# Patient Record
Sex: FEMALE
[De-identification: names, ages, dates, MRNs, and addresses within clinical notes are randomized per-mention and may not be internally consistent; named-entity substitution may affect disease eponyms.]

---

## 2018-10-06 ENCOUNTER — HOSPITAL ENCOUNTER (INPATIENT)
Dept: HOSPITAL 31 - C.ER | Age: 68
LOS: 4 days | Discharge: HOME | DRG: 391 | End: 2018-10-10
Attending: INTERNAL MEDICINE | Admitting: INTERNAL MEDICINE
Payer: MEDICARE

## 2018-10-06 DIAGNOSIS — Z90.49: ICD-10-CM

## 2018-10-06 DIAGNOSIS — K20.9: ICD-10-CM

## 2018-10-06 DIAGNOSIS — I10: ICD-10-CM

## 2018-10-06 DIAGNOSIS — I81: ICD-10-CM

## 2018-10-06 DIAGNOSIS — K63.5: ICD-10-CM

## 2018-10-06 DIAGNOSIS — J44.9: ICD-10-CM

## 2018-10-06 DIAGNOSIS — K29.00: Primary | ICD-10-CM

## 2018-10-06 DIAGNOSIS — J30.2: ICD-10-CM

## 2018-10-06 DIAGNOSIS — Z90.710: ICD-10-CM

## 2018-10-06 DIAGNOSIS — I35.0: ICD-10-CM

## 2018-10-06 DIAGNOSIS — E03.9: ICD-10-CM

## 2018-10-06 DIAGNOSIS — E87.6: ICD-10-CM

## 2018-10-06 DIAGNOSIS — G47.33: ICD-10-CM

## 2018-10-06 DIAGNOSIS — Z80.0: ICD-10-CM

## 2018-10-06 DIAGNOSIS — K59.09: ICD-10-CM

## 2018-10-06 DIAGNOSIS — Z82.49: ICD-10-CM

## 2018-10-06 DIAGNOSIS — Z86.718: ICD-10-CM

## 2018-10-06 DIAGNOSIS — T38.0X5A: ICD-10-CM

## 2018-10-06 DIAGNOSIS — R13.10: ICD-10-CM

## 2018-10-06 DIAGNOSIS — Z90.81: ICD-10-CM

## 2018-10-06 DIAGNOSIS — E78.5: ICD-10-CM

## 2018-10-06 DIAGNOSIS — Z79.01: ICD-10-CM

## 2018-10-06 LAB
ALBUMIN SERPL-MCNC: 4.3 G/DL (ref 3.5–5)
ALBUMIN/GLOB SERPL: 1.4 {RATIO} (ref 1–2.1)
ALT SERPL-CCNC: 31 U/L (ref 9–52)
AST SERPL-CCNC: 22 U/L (ref 14–36)
BASOPHILS # BLD AUTO: 0.1 K/UL (ref 0–0.2)
BASOPHILS NFR BLD: 0.3 % (ref 0–2)
BUN SERPL-MCNC: 26 MG/DL (ref 7–17)
CALCIUM SERPL-MCNC: 9.7 MG/DL (ref 8.6–10.4)
EOSINOPHIL # BLD AUTO: 0 K/UL (ref 0–0.7)
EOSINOPHIL NFR BLD: 0.1 % (ref 0–4)
ERYTHROCYTE [DISTWIDTH] IN BLOOD BY AUTOMATED COUNT: 14 % (ref 11.5–14.5)
GFR NON-AFRICAN AMERICAN: > 60
HGB BLD-MCNC: 14.5 G/DL (ref 11–16)
INR PPP: 1.1
LYMPHOCYTES # BLD AUTO: 4.6 K/UL (ref 1–4.3)
LYMPHOCYTES NFR BLD AUTO: 25 % (ref 20–40)
MCH RBC QN AUTO: 29.7 PG (ref 27–31)
MCHC RBC AUTO-ENTMCNC: 33.7 G/DL (ref 33–37)
MCV RBC AUTO: 88 FL (ref 81–99)
MONOCYTES # BLD: 0.9 K/UL (ref 0–0.8)
MONOCYTES NFR BLD: 5.1 % (ref 0–10)
NEUTROPHILS # BLD: 12.8 K/UL (ref 1.8–7)
NEUTROPHILS NFR BLD AUTO: 69.5 % (ref 50–75)
NRBC BLD AUTO-RTO: 0 % (ref 0–2)
PLATELET # BLD: 364 K/UL (ref 130–400)
PMV BLD AUTO: 8.7 FL (ref 7.2–11.7)
PROTHROMBIN TIME: 11.5 SECONDS (ref 9.7–12.2)
RBC # BLD AUTO: 4.9 MIL/UL (ref 3.8–5.2)
WBC # BLD AUTO: 18.4 K/UL (ref 4.8–10.8)

## 2018-10-07 LAB
ALBUMIN SERPL-MCNC: 3.5 G/DL (ref 3.5–5)
ALBUMIN/GLOB SERPL: 1.3 {RATIO} (ref 1–2.1)
ALT SERPL-CCNC: 41 U/L (ref 9–52)
AST SERPL-CCNC: 39 U/L (ref 14–36)
BASOPHILS # BLD AUTO: 0 K/UL (ref 0–0.2)
BASOPHILS NFR BLD: 0.3 % (ref 0–2)
BILIRUB UR-MCNC: NEGATIVE MG/DL
BUN SERPL-MCNC: 31 MG/DL (ref 7–17)
CALCIUM SERPL-MCNC: 9 MG/DL (ref 8.6–10.4)
EOSINOPHIL # BLD AUTO: 0.1 K/UL (ref 0–0.7)
EOSINOPHIL NFR BLD: 1 % (ref 0–4)
ERYTHROCYTE [DISTWIDTH] IN BLOOD BY AUTOMATED COUNT: 14.4 % (ref 11.5–14.5)
GFR NON-AFRICAN AMERICAN: > 60
GLUCOSE UR STRIP-MCNC: NORMAL MG/DL
HDLC SERPL-MCNC: 69 MG/DL (ref 30–70)
HGB BLD-MCNC: 13.2 G/DL (ref 11–16)
LDLC SERPL-MCNC: 77 MG/DL (ref 0–129)
LEUKOCYTE ESTERASE UR-ACNC: (no result) LEU/UL
LYMPHOCYTES # BLD AUTO: 3 K/UL (ref 1–4.3)
LYMPHOCYTES NFR BLD AUTO: 21.1 % (ref 20–40)
MCH RBC QN AUTO: 29.9 PG (ref 27–31)
MCHC RBC AUTO-ENTMCNC: 33.6 G/DL (ref 33–37)
MCV RBC AUTO: 88.9 FL (ref 81–99)
MONOCYTES # BLD: 1.2 K/UL (ref 0–0.8)
MONOCYTES NFR BLD: 8.6 % (ref 0–10)
NEUTROPHILS # BLD: 9.7 K/UL (ref 1.8–7)
NEUTROPHILS NFR BLD AUTO: 69 % (ref 50–75)
NRBC BLD AUTO-RTO: 0 % (ref 0–2)
PH UR STRIP: 6 [PH] (ref 5–8)
PLATELET # BLD: 304 K/UL (ref 130–400)
PMV BLD AUTO: 9 FL (ref 7.2–11.7)
PROT UR STRIP-MCNC: NEGATIVE MG/DL
RBC # BLD AUTO: 4.42 MIL/UL (ref 3.8–5.2)
RBC # UR STRIP: NEGATIVE /UL
SP GR UR STRIP: 1.04 (ref 1–1.03)
SQUAMOUS EPITHIAL: < 1 /HPF (ref 0–5)
UROBILINOGEN UR-MCNC: NORMAL MG/DL (ref 0.2–1)
WBC # BLD AUTO: 14.1 K/UL (ref 4.8–10.8)

## 2018-10-07 RX ADMIN — ROSUVASTATIN CALCIUM SCH MG: 5 TABLET, FILM COATED ORAL at 21:28

## 2018-10-07 RX ADMIN — DEXTROSE AND SODIUM CHLORIDE SCH MLS/HR: 5; 450 INJECTION, SOLUTION INTRAVENOUS at 17:36

## 2018-10-07 RX ADMIN — POLYETHYLENE GLYCOL 3350 SCH GM: 17 POWDER, FOR SOLUTION ORAL at 17:32

## 2018-10-07 RX ADMIN — DEXTROSE AND SODIUM CHLORIDE SCH MLS/HR: 5; 450 INJECTION, SOLUTION INTRAVENOUS at 02:01

## 2018-10-07 NOTE — C.PDOC
History Of Present Illness


67 year old female, with PSHx of splenectomy and cholecystectomy, presents to ED

with complaints of LUQ epigastric pain, associated with nausea and vomiting. 

Patient takes eliquis for portal vein thrombosis annually. Denies any diarrhea 

or constipation. 


Time Seen by Provider: 10/06/18 23:08


Chief Complaint (Nursing): Abdominal Pain


History Per: Patient


History/Exam Limitations: no limitations


Onset/Duration Of Symptoms: Days


Current Symptoms Are (Timing): Still Present





Past Medical History


Reviewed: Historical Data, Nursing Documentation, Vital Signs


Vital Signs: 





                                Last Vital Signs











Temp  98 F   10/06/18 22:29


 


Pulse  67   10/06/18 22:29


 


Resp  22   10/06/18 22:29


 


BP  146/101 H  10/06/18 22:29


 


Pulse Ox  99   10/06/18 22:29














- Medical History


PMH: HTN


Family History: States: No Known Family Hx





- Social History


Hx Alcohol Use: Yes


Hx Substance Use: No





Review Of Systems


Except As Marked, All Systems Reviewed And Found Negative.


Cardiovascular: Negative for: Chest Pain


Respiratory: Negative for: Shortness of Breath


Gastrointestinal: Positive for: Nausea, Vomiting, Abdominal Pain (LUQ 

epigastric).  Negative for: Diarrhea


Neurological: Negative for: Weakness, Numbness





Physical Exam





- Physical Exam


Appears: Non-toxic, Other (Moderate distress)


Skin: Warm, Dry


Head: Atraumatic, Normacephalic


Eye(s): bilateral: Normal Inspection


Oral Mucosa: Moist


Chest: Symmetrical


Cardiovascular: Rhythm Regular, No Murmur


Respiratory: Normal Breath Sounds, No Rales, No Rhonchi, No Wheezing


Gastrointestinal/Abdominal: Bowel Sounds (Diminished), Tenderness (in 

epigastrium)


Neurological/Psych: Oriented x3, Normal Speech





ED Course And Treatment





- Laboratory Results


Result Diagrams: 


                                 10/06/18 22:57





                                 10/06/18 22:57


Lab Interpretation: Abnormal (LFT, trop neg.)


ECG: Interpreted By Me


ECG Rhythm: Sinus Rhythm


ECG Interpretation: Normal


Rate From EC


O2 Sat by Pulse Oximetry: 99 (RA)


Pulse Ox Interpretation: Normal





- Radiology


CXR: Interpreted by Me


CXR Interpretation: Yes: No Acute Disease





- Other Rad


  ** abd x 2


X-Ray: Interpreted by Me (+FOS)





- CT Scan/US


  ** CT Abd/Pel


Other Rad Studies (CT/US): Read By Radiologist, Radiology Report Reviewed


CT/US Interpretation: IMPRESSION:  Changes from prior thrombosis of the left 

branch of the portal vein. It appears that small in caliber but recanalize with 

normal flow the current exam.  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 

the level of the yfn hepatis.  Uncomplicated colonic diverticulosis.  

Absent/atrophic spleen.  Prior hysterectomy.  No evidence of acute abdominal or 

pelvic pathology.


Reevaluation Time: 01:09


Reassessment Condition: Improved (belly benign)





- Physician Consult Information


Outcome Of Conversation: 0100: d/w Dr. Trujillo- Medicine On Call- ok to 

admit.





Medical Decision Making


Medical Decision Making: 


Impression: Abdominal pain





Plan:


--CT Abd/Pel


--EKG


--Labs


--Obstructive X-Ray


--IV Fluids


--Pepcid


--Zofran


--Toradol


--Percocet


--Urinalysis








h/o splenectomy and portla vein thrombosis


on eliquis BID PO


normal flow today





Acute colicky pain


+FOS


recommend overnight laxative





Leukocytosis 


no acute infection


prob related to vomiting


recheck for trend in AM








Disposition


Doctor Will See Patient In The: Hospital


Counseled Patient/Family Regarding: Studies Performed, Diagnosis





- Disposition


Disposition: HOSPITALIZED


Disposition Time: 01:11


Condition: GOOD





- Clinical Impression


Clinical Impression: 


 Acute abdominal pain








- Scribe Statement


The provider has reviewed the documentation as recorded by the Neil Shankar





Provider Attestation: 


All medical record entries made by the Neil were at my direction and 

personally dictated by me. I have reviewed the chart and agree that the record 

accurately reflects my personal performance of the history, physical exam, 

medical decision making, and the department course for this patient. I have also

personally directed, reviewed, and agree with the discharge instructions and 

disposition.

## 2018-10-07 NOTE — CT
Date of service: 



10/07/2018



PROCEDURE:  CT Abdomen and Pelvis with Oral contrast.



HISTORY:

Abdominal pain, with history of portal vein thrombosis.



COMPARISON:

No prior study available for comparison



TECHNIQUE:

Contiguous axial images of the abdomen and pelvis performed following 

intravenous injection of approximately 100 cc Visipaque 320 contrast 

material.  The coronal and Sagittal reformats generated. 



Radiation dose:



Total exam DLP = 498.23 mGy-cm.



This CT exam was performed using one or more of the following dose 

reduction techniques: Automated exposure control, adjustment of the 

mA and/or kV according to patient size, and/or use of iterative 

reconstruction technique.



FINDINGS:



LOWER THORAX:

There is mild passive / dependent type atelectasis both posterior 

lower lung zones. There are also areas of linear atelectasis/scarring 

in the both lung bases left greater than right including the lingular 

region. No effusion or basilar pneumothorax. Heart size within range 

of normal. No significant pericardial effusion. Small hiatal hernia. 



Including the lingular 



LIVER:

The liver exhibits normal size measuring nearly 16 cm in CC 

dimension.  Moderate fatty hepatic infiltration.  No obvious hepatic 

mass collection or calcification.. 



Suspect cavernous transformation related to prior on thrombosis of 

the portal vein, presumably the left portal vein which is currently 

patent (likely recannulated) though small in caliber than the right 



GALLBLADDER AND BILE DUCTS:

Gallbladder is not visible and presumably has been resected however 

clinical correlation recommended. 



PANCREAS:

Pancreas appears slightly atrophic and fatty replaced.  No obvious 

pancreatic masses collections or calcifications. 



SPLEEN:

Spleen is not visible and may be atrophic or may have been surgically 

resected however clinical correlation recommended. 



ADRENALS:

No adrenal lesions.. 



KIDNEYS AND URETERS:

Kidneys demonstrate symmetric nephrograms. No evidence of 

nephrolithiasis or hydronephrosis. 



BLADDER:

Grossly unremarkable.



REPRODUCTIVE:

Hysterectomy 



APPENDIX:

Appendix is not seen with complete certainty however no obvious 

inflammatory changes right lower quadrant of the abdomen. 



BOWEL:

Evaluation of the bowel is limited due to the lack of oral contrast 

material.  Stomach is incompletely distended.  Visualized loops of 

small bowel exhibit normal contour and caliber.  No evidence of acute 

mechanical small bowel obstruction.  Moderate amount of stool seen 

within the cecum ascending and proximal transverse colon suggesting 

mild fecal retention/constipation. 



PERITONEUM:

Unremarkable. No fluid collection. No free air.  Small fat containing 

umbilical hernia.  Small fat containing right inguinal hernia. 



LYMPH NODES:

Multiple small nonspecific mesenteric lymph nodes are present. There 

are also a few tiny nonspecific retroperitoneal lymph nodes. 



VASCULATURE:

Unremarkable. No aortic aneurysm. 



BONES:

Mild multilevel degenerative spondylosis of the lower thoracic and to 

a lesser degree lumbar spine. 



OTHER FINDINGS:

None. 



IMPRESSION:

Fatty hepatic infiltration. Suspect cavernous transformation related 

to prior on thrombosis of the portal vein, presumably the left portal 

vein which is currently patent (likely recannulated) though small in 

caliber than the right. 



Gallbladder not visualized and presumably has been resected however 

clinical correlation surgical history.  The spleen is not present and 

there are no obvious surgical clips or suture material seen in the 

left upper quadrant of the abdomen.  Rule out splenic atrophy versus 

post surgical sequela.



Hysterectomy.

## 2018-10-07 NOTE — CP.PCM.PN
Subjective





- Date & Time of Evaluation


Date of Evaluation: 10/07/18


Time of Evaluation: 11:48





- Subjective


Subjective: 





H&P dictated #[id number]





Objective





- Vital Signs/Intake and Output


Vital Signs (last 24 hours): 


                                        











Temp Pulse Resp BP Pulse Ox


 


 98.6 F   53 L  20   116/66   99 


 


 10/07/18 08:14  10/07/18 08:14  10/07/18 08:14  10/07/18 08:14  10/07/18 08:14











- Medications


Medications: 


                               Current Medications





Al Hydrox/Mg Hydrox/Simethicone (Maalox 30 Ml)  30 ml PO ONCE ONE


   Stop: 10/07/18 12:01


   Last Admin: 10/07/18 11:41 Dose:  30 ml


Albuterol (Ventolin Hfa 90 Mcg/Actuation (8 G))  1 puff IH PRN PRN


   PRN Reason: Shortness of Breath


Apixaban (Eliquis)  5 mg PO BID [facility]


   Last Admin: 10/07/18 10:07 Dose:  5 mg


Home Med (Linaclotide [Linzess])  290 mcg PO DAILY [facility]


   Last Admin: 10/07/18 10:08 Dose:  Not Given


Home Med (Omeprazole [Omeprazole])  40 mg PO DAILY [facility]


   Last Admin: 10/07/18 10:08 Dose:  Not Given


Hydrochlorothiazide (Microzide)  25 mg PO DAILY [facility]


   Last Admin: 10/07/18 10:07 Dose:  25 mg


Dextrose/Sodium Chloride (Dextrose 5%/0.45% Ns 1000 Ml)  1,000 mls @ 75 mls/hr 

IV .[id number] [facility]


   Last Admin: 10/07/18 02:01 Dose:  75 mls/hr


Levothyroxine Sodium (Levothroid)  137 mcg PO DAILY [facility]


   Last Admin: 10/07/18 10:07 Dose:  137 mcg


Ondansetron HCl (Zofran Inj)  4 mg IVP Q8 PRN


   PRN Reason: Nausea/Vomiting


Pantoprazole Sodium (Protonix Inj)  40 mg IVP ONCE ONE


   Stop: 10/07/18 12:01


   Last Admin: 10/07/18 11:41 Dose:  40 mg











- Labs


Labs: 


                                        





                                 10/07/18 08:43 





                                 10/07/18 08:43 





                                        











PT  11.5 SECONDS (9.7-12.2)   10/06/18  23:19    


 


INR  1.1   10/06/18  23:19

## 2018-10-07 NOTE — CP.PCM.CON
<KaylieJose R - Last Filed: 10/07/18 15:51>





History of Present Illness





- History of Present Illness


History of Present Illness: 





GI Fellow PGY4, consult note.





Montserrat Wetzel is a very pleasant 67F [place] speaking only with hx 

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 

procedures presenting with acute epigastric pain, N/V. She felt well yesterday 

until she was at a friends house at a social gathering. 30 minutes into 

arriving, she developed progressive burning epigastric pain that was 10/10 at 

max. She denies eating or drinking directly before this painful episode. There 

was report of emesis but not bloody emesis or blood in stool reported. She was 

unable to tolerate the pain and ambulance was called. She was given medications 

through the IV which helped. Now, she is in 4/10 pain. She admits she has had 

progressive dysphagia as well, solids more than liquids. Recently she felt like 

she was choking on food.





Patient has had abdominal pains in the past. In fact, she had EGD and 

colonoscopy one year ago at different facility. There were no reported problems 

except for polyps in the colon per patient's memory. She see Dr. Hernandez as an 

outpatient GI. She takes Omeprazole intermittently. She had been taking 

prednisone x3 days for a cough prior to presentation. She denies taking much 

Ibuprofen or other NSAIDs. No history of GI bleed in the past. She admits her 

father  from gastric cancer. She denies smoking or alcohol use. 





She had PVT 8 years ago and it resolved on Coumadin. Unfortunately, it returned 

and now she is on Eliquis indefinitely.





CT abd/pelv reviewed. No signs of obstruction. Moderate amount of stool in right

colon. 





Hb normal and stable.





PMHx - see above


PSHx - cholecystectomy, splenectomy, hysterectomy, EGD/CSPY in 2017


FMHx - Father gastric cancer


SocHx - denies drinking EtOH, denies smoking. . 





12pt ROS negative except for above.

















Past Patient History





- Past Social History


Smoking Status: Never Smoked





- CARDIAC


Hx Hypertension: Yes





- ENDOCRINE/METABOLIC


Other/Comment: thyroid disease





- PSYCHIATRIC


Hx Substance Use: No





Meds


Allergies/Adverse Reactions: 


                                    Allergies











Allergy/AdvReac Type Severity Reaction Status Date / Time


 


morphine Allergy   Verified 10/06/18 22:34














- Medications


Medications: 


                               Current Medications





Al Hydrox/Mg Hydrox/Simethicone (Maalox Plus 30 Ml)  30 ml PO Q8H PRN


   PRN Reason: Indigestion / Heartburn


Albuterol (Ventolin Hfa 90 Mcg/Actuation (8 G))  1 puff IH RQ24 PRN


   PRN Reason: Shortness of Breath


Apixaban (Eliquis)  5 mg PO BID [facility]


   Last Admin: 10/07/18 10:07 Dose:  5 mg


Home Med (Linaclotide [Linzess])  290 mcg PO DAILY [facility]


Hydrochlorothiazide (Hydrodiuril)  25 mg PO DAILY [facility]


Dextrose/Sodium Chloride (Dextrose 5%/0.45% Ns 1000 Ml)  1,000 mls @ 75 mls/hr 

IV .[id number] [facility]


   Last Admin: 10/07/18 02:01 Dose:  75 mls/hr


Levothyroxine Sodium (Levothroid)  137 mcg PO DAILY@0630 [facility]


Ondansetron HCl (Zofran Inj)  4 mg IVP Q8 PRN


   PRN Reason: Nausea/Vomiting


Pantoprazole Sodium (Protonix Inj)  40 mg IVP DAILY [facility]


Rosuvastatin Calcium (Crestor)  2.5 mg PO HS [facility]











Physical Exam





- Constitutional


Appears: Well, Non-toxic, No Acute Distress





- Head Exam


Head Exam: NORMAL INSPECTION, NORMOCEPHALIC





- Eye Exam


Eye Exam: EOMI, Normal appearance





- ENT Exam


ENT Exam: Mucous Membranes Moist, Normal Exam





- Respiratory Exam


Respiratory Exam: Clear to Auscultation Bilateral, NORMAL BREATHING PATTERN





- Cardiovascular Exam


Cardiovascular Exam: Bradycardia, REGULAR RHYTHM, +S1, +S2





- GI/Abdominal Exam


GI & Abdominal Exam: Normal Bowel Sounds, Soft, Tenderness


Additional comments: 





Moderate tenderness in epigastric area





- Extremities Exam


Extremities exam: Positive for: normal inspection


Additional comments: 





Left arm swelling (IV infiltration)





- Neurological Exam


Neurological exam: Alert, CN II-XII Intact, Oriented x3





- Psychiatric Exam


Psychiatric exam: Normal Affect, Normal Mood





- Skin


Skin Exam: Dry, Intact





Results





- Vital Signs


Recent Vital Signs: 


                                Last Vital Signs











Temp  98.6 F   10/07/18 08:14


 


Pulse  53 L  10/07/18 08:14


 


Resp  20   10/07/18 08:14


 


BP  116/66   10/07/18 08:14


 


Pulse Ox  99   10/07/18 08:14














- Labs


Result Diagrams: 


                                 10/07/18 08:43





                                 10/07/18 08:43


Labs: 


                         Laboratory Results - last 24 hr











  10/06/18 10/06/18 10/06/18





  22:57 22:57 23:13


 


WBC  18.4 H  


 


RBC  4.90  


 


Hgb  14.5  


 


Hct  43.1  


 


MCV  88.0  


 


MCH  29.7  


 


MCHC  33.7  


 


RDW  14.0  


 


Plt Count  364  


 


MPV  8.7  


 


Neut % (Auto)  69.5  


 


Lymph % (Auto)  25.0  


 


Mono % (Auto)  5.1  


 


Eos % (Auto)  0.1  


 


Baso % (Auto)  0.3  


 


Neut # (Auto)  12.8 H  


 


Lymph # (Auto)  4.6 H  


 


Mono # (Auto)  0.9 H  


 


Eos # (Auto)  0.0  


 


Baso # (Auto)  0.1  


 


PT   


 


INR   


 


Sodium   141 


 


Potassium   3.4 L 


 


Chloride   105 


 


Carbon Dioxide   23 


 


Anion Gap   16 


 


BUN   26 H 


 


Creatinine   0.7 


 


Est GFR ( Amer)   > 60 


 


Est GFR (Non-Af Amer)   > 60 


 


Random Glucose   155 H 


 


Calcium   9.7 


 


Total Bilirubin   0.3 


 


AST   22 


 


ALT   31 


 


Alkaline Phosphatase   105 


 


Troponin I   


 


Total Protein   7.3 


 


Albumin   4.3 


 


Globulin   3.0 


 


Albumin/Globulin Ratio   1.4 


 


Triglycerides   


 


Cholesterol   


 


LDL Cholesterol Direct   


 


HDL Cholesterol   


 


Lipase   


 


TSH 3rd Generation   


 


Urine Color    Yellow


 


Urine Clarity    Clear


 


Urine pH    6.0


 


Ur Specific Gravity    1.045 H


 


Urine Protein    Negative


 


Urine Glucose (UA)    Normal


 


Urine Ketones    Negative


 


Urine Blood    Negative


 


Urine Nitrate    Negative


 


Urine Bilirubin    Negative


 


Urine Urobilinogen    Normal


 


Ur Leukocyte Esterase    1+ H


 


Urine WBC (Auto)    2


 


Urine RBC (Auto)    2


 


Ur Squamous Epith Cells    < 1














  10/06/18 10/06/18 10/07/18





  23:19 23:20 00:19


 


WBC   


 


RBC   


 


Hgb   


 


Hct   


 


MCV   


 


MCH   


 


MCHC   


 


RDW   


 


Plt Count   


 


MPV   


 


Neut % (Auto)   


 


Lymph % (Auto)   


 


Mono % (Auto)   


 


Eos % (Auto)   


 


Baso % (Auto)   


 


Neut # (Auto)   


 


Lymph # (Auto)   


 


Mono # (Auto)   


 


Eos # (Auto)   


 


Baso # (Auto)   


 


PT  11.5  


 


INR  1.1  


 


Sodium   


 


Potassium   


 


Chloride   


 


Carbon Dioxide   


 


Anion Gap   


 


BUN   


 


Creatinine   


 


Est GFR ( Amer)   


 


Est GFR (Non-Af Amer)   


 


Random Glucose   


 


Calcium   


 


Total Bilirubin   


 


AST   


 


ALT   


 


Alkaline Phosphatase   


 


Troponin I    < 0.0120


 


Total Protein   


 


Albumin   


 


Globulin   


 


Albumin/Globulin Ratio   


 


Triglycerides   


 


Cholesterol   


 


LDL Cholesterol Direct   


 


HDL Cholesterol   


 


Lipase   132 


 


TSH 3rd Generation   


 


Urine Color   


 


Urine Clarity   


 


Urine pH   


 


Ur Specific Gravity   


 


Urine Protein   


 


Urine Glucose (UA)   


 


Urine Ketones   


 


Urine Blood   


 


Urine Nitrate   


 


Urine Bilirubin   


 


Urine Urobilinogen   


 


Ur Leukocyte Esterase   


 


Urine WBC (Auto)   


 


Urine RBC (Auto)   


 


Ur Squamous Epith Cells   














  10/07/18 10/07/18 10/07/18





  08:43 08:43 13:41


 


WBC  14.1 H  


 


RBC  4.42  


 


Hgb  13.2  


 


Hct  39.3  


 


MCV  88.9  


 


MCH  29.9  


 


MCHC  33.6  


 


RDW  14.4  


 


Plt Count  304  


 


MPV  9.0  


 


Neut % (Auto)  69.0  


 


Lymph % (Auto)  21.1  


 


Mono % (Auto)  8.6  


 


Eos % (Auto)  1.0  


 


Baso % (Auto)  0.3  


 


Neut # (Auto)  9.7 H  


 


Lymph # (Auto)  3.0  


 


Mono # (Auto)  1.2 H  


 


Eos # (Auto)  0.1  


 


Baso # (Auto)  0.0  


 


PT   


 


INR   


 


Sodium   141 


 


Potassium   3.5 L 


 


Chloride   103 


 


Carbon Dioxide   28 


 


Anion Gap   13 


 


BUN   31 H 


 


Creatinine   0.8 


 


Est GFR ( Amer)   > 60 


 


Est GFR (Non-Af Amer)   > 60 


 


Random Glucose   105 


 


Calcium   9.0 


 


Total Bilirubin   0.7 


 


AST   39 H D 


 


ALT   41 


 


Alkaline Phosphatase   72 


 


Troponin I    < 0.0120


 


Total Protein   6.2 L 


 


Albumin   3.5 


 


Globulin   2.7 


 


Albumin/Globulin Ratio   1.3 


 


Triglycerides   63 


 


Cholesterol   169 


 


LDL Cholesterol Direct   77 


 


HDL Cholesterol   69 


 


Lipase   


 


TSH 3rd Generation   0.55 


 


Urine Color   


 


Urine Clarity   


 


Urine pH   


 


Ur Specific Gravity   


 


Urine Protein   


 


Urine Glucose (UA)   


 


Urine Ketones   


 


Urine Blood   


 


Urine Nitrate   


 


Urine Bilirubin   


 


Urine Urobilinogen   


 


Ur Leukocyte Esterase   


 


Urine WBC (Auto)   


 


Urine RBC (Auto)   


 


Ur Squamous Epith Cells   














Assessment & Plan





- Assessment and Plan (Free Text)


Assessment: 





67F presenting with epigastric pain





#Acute abdominal pain - possible PUD vs constipation


#Dyspepsia


#Dysphagia


#Fm hx of Gastric CA


#Chronic constipation on Linzess


#Hx of PVT on Eliquis


#Hypothyroidism


#s/p dilip, splenectomy, hysterectomy





PLAN:


-CT reviewed: No obstruction. Portal veins are patent. Moderate stool in colon. 


-Continue PPI. Stop toradol. 


-Bowel regimen


-Recommend EGD for acute epigastric pain, dysphagia and family history. 


-She is on Eliquis, last dose today. We could possible do diagnostic study 

tomorrow or wait until Wednesday/Thursday. This could be done as outpatient if 

patient feels much improved. I will place Eliquis on hold for now and discuss 

with AM team.


-Consent in chart. 


-NPO PM.





- Date & Time


Date: 10/07/18


Time: 16:09





<Jenni Moctezuma - Last Filed: 10/07/18 21:35>





Meds





- Medications


Medications: 


                               Current Medications





Al Hydrox/Mg Hydrox/Simethicone (Maalox Plus 30 Ml)  30 ml PO Q8H PRN


   PRN Reason: Indigestion / Heartburn


Albuterol (Ventolin Hfa 90 Mcg/Actuation (8 G))  1 puff IH RQ24 PRN


   PRN Reason: Shortness of Breath


Apixaban (Eliquis)  5 mg PO BID [facility]


   Last Admin: 10/07/18 10:07 Dose:  5 mg


Docusate Sodium (Colace)  100 mg PO BID [facility]


   Last Admin: 10/07/18 17:34 Dose:  100 mg


Famotidine (Pepcid)  20 mg IVP DAILY [facility]


Home Med (Linaclotide [Linzess])  290 mcg PO DAILY [facility]


Hydrochlorothiazide (Hydrodiuril)  25 mg PO DAILY [facility]


Dextrose/Sodium Chloride (Dextrose 5%/0.45% Ns 1000 Ml)  1,000 mls @ 75 mls/hr 

IV .[id number] [facility]


   Last Admin: 10/07/18 17:36 Dose:  75 mls/hr


Levothyroxine Sodium (Levothroid)  137 mcg PO DAILY@0630 [facility]


Ondansetron HCl (Zofran Inj)  4 mg IVP Q8 PRN


   PRN Reason: Nausea/Vomiting


Pantoprazole Sodium (Protonix Inj)  40 mg IVP DAILY [facility]


Polyethylene Glycol (Miralax)  17 gm PO BID [facility]


   Last Admin: 10/07/18 17:32 Dose:  17 gm


Rosuvastatin Calcium (Crestor)  2.5 mg PO HS [facility]


   Last Admin: 10/07/18 21:28 Dose:  2.5 mg











Results





- Vital Signs


Recent Vital Signs: 


                                Last Vital Signs











Temp  97.6 F   10/07/18 15:00


 


Pulse  50 L  10/07/18 15:00


 


Resp  20   10/07/18 15:00


 


BP  153/88 H  10/07/18 15:00


 


Pulse Ox  98   10/07/18 15:00














- Labs


Result Diagrams: 


                                 10/07/18 08:43





                                 10/07/18 08:43


Labs: 


                         Laboratory Results - last 24 hr











  10/06/18 10/06/18 10/06/18





  22:57 22:57 23:13


 


WBC  18.4 H  


 


RBC  4.90  


 


Hgb  14.5  


 


Hct  43.1  


 


MCV  88.0  


 


MCH  29.7  


 


MCHC  33.7  


 


RDW  14.0  


 


Plt Count  364  


 


MPV  8.7  


 


Neut % (Auto)  69.5  


 


Lymph % (Auto)  25.0  


 


Mono % (Auto)  5.1  


 


Eos % (Auto)  0.1  


 


Baso % (Auto)  0.3  


 


Neut # (Auto)  12.8 H  


 


Lymph # (Auto)  4.6 H  


 


Mono # (Auto)  0.9 H  


 


Eos # (Auto)  0.0  


 


Baso # (Auto)  0.1  


 


PT   


 


INR   


 


Sodium   141 


 


Potassium   3.4 L 


 


Chloride   105 


 


Carbon Dioxide   23 


 


Anion Gap   16 


 


BUN   26 H 


 


Creatinine   0.7 


 


Est GFR ( Amer)   > 60 


 


Est GFR (Non-Af Amer)   > 60 


 


Random Glucose   155 H 


 


Calcium   9.7 


 


Total Bilirubin   0.3 


 


AST   22 


 


ALT   31 


 


Alkaline Phosphatase   105 


 


Troponin I   


 


Total Protein   7.3 


 


Albumin   4.3 


 


Globulin   3.0 


 


Albumin/Globulin Ratio   1.4 


 


Triglycerides   


 


Cholesterol   


 


LDL Cholesterol Direct   


 


HDL Cholesterol   


 


Lipase   


 


TSH 3rd Generation   


 


Urine Color    Yellow


 


Urine Clarity    Clear


 


Urine pH    6.0


 


Ur Specific Gravity    1.045 H


 


Urine Protein    Negative


 


Urine Glucose (UA)    Normal


 


Urine Ketones    Negative


 


Urine Blood    Negative


 


Urine Nitrate    Negative


 


Urine Bilirubin    Negative


 


Urine Urobilinogen    Normal


 


Ur Leukocyte Esterase    1+ H


 


Urine WBC (Auto)    2


 


Urine RBC (Auto)    2


 


Ur Squamous Epith Cells    < 1














  10/06/18 10/06/18 10/07/18





  23:19 23:20 00:19


 


WBC   


 


RBC   


 


Hgb   


 


Hct   


 


MCV   


 


MCH   


 


MCHC   


 


RDW   


 


Plt Count   


 


MPV   


 


Neut % (Auto)   


 


Lymph % (Auto)   


 


Mono % (Auto)   


 


Eos % (Auto)   


 


Baso % (Auto)   


 


Neut # (Auto)   


 


Lymph # (Auto)   


 


Mono # (Auto)   


 


Eos # (Auto)   


 


Baso # (Auto)   


 


PT  11.5  


 


INR  1.1  


 


Sodium   


 


Potassium   


 


Chloride   


 


Carbon Dioxide   


 


Anion Gap   


 


BUN   


 


Creatinine   


 


Est GFR ( Amer)   


 


Est GFR (Non-Af Amer)   


 


Random Glucose   


 


Calcium   


 


Total Bilirubin   


 


AST   


 


ALT   


 


Alkaline Phosphatase   


 


Troponin I    < 0.0120


 


Total Protein   


 


Albumin   


 


Globulin   


 


Albumin/Globulin Ratio   


 


Triglycerides   


 


Cholesterol   


 


LDL Cholesterol Direct   


 


HDL Cholesterol   


 


Lipase   132 


 


TSH 3rd Generation   


 


Urine Color   


 


Urine Clarity   


 


Urine pH   


 


Ur Specific Gravity   


 


Urine Protein   


 


Urine Glucose (UA)   


 


Urine Ketones   


 


Urine Blood   


 


Urine Nitrate   


 


Urine Bilirubin   


 


Urine Urobilinogen   


 


Ur Leukocyte Esterase   


 


Urine WBC (Auto)   


 


Urine RBC (Auto)   


 


Ur Squamous Epith Cells   














  10/07/18 10/07/18 10/07/18





  08:43 08:43 13:41


 


WBC  14.1 H  


 


RBC  4.42  


 


Hgb  13.2  


 


Hct  39.3  


 


MCV  88.9  


 


MCH  29.9  


 


MCHC  33.6  


 


RDW  14.4  


 


Plt Count  304  


 


MPV  9.0  


 


Neut % (Auto)  69.0  


 


Lymph % (Auto)  21.1  


 


Mono % (Auto)  8.6  


 


Eos % (Auto)  1.0  


 


Baso % (Auto)  0.3  


 


Neut # (Auto)  9.7 H  


 


Lymph # (Auto)  3.0  


 


Mono # (Auto)  1.2 H  


 


Eos # (Auto)  0.1  


 


Baso # (Auto)  0.0  


 


PT   


 


INR   


 


Sodium   141 


 


Potassium   3.5 L 


 


Chloride   103 


 


Carbon Dioxide   28 


 


Anion Gap   13 


 


BUN   31 H 


 


Creatinine   0.8 


 


Est GFR ( Amer)   > 60 


 


Est GFR (Non-Af Amer)   > 60 


 


Random Glucose   105 


 


Calcium   9.0 


 


Total Bilirubin   0.7 


 


AST   39 H D 


 


ALT   41 


 


Alkaline Phosphatase   72 


 


Troponin I    < 0.0120


 


Total Protein   6.2 L 


 


Albumin   3.5 


 


Globulin   2.7 


 


Albumin/Globulin Ratio   1.3 


 


Triglycerides   63 


 


Cholesterol   169 


 


LDL Cholesterol Direct   77 


 


HDL Cholesterol   69 


 


Lipase   


 


TSH 3rd Generation   0.55 


 


Urine Color   


 


Urine Clarity   


 


Urine pH   


 


Ur Specific Gravity   


 


Urine Protein   


 


Urine Glucose (UA)   


 


Urine Ketones   


 


Urine Blood   


 


Urine Nitrate   


 


Urine Bilirubin   


 


Urine Urobilinogen   


 


Ur Leukocyte Esterase   


 


Urine WBC (Auto)   


 


Urine RBC (Auto)   


 


Ur Squamous Epith Cells   














Attending/Attestation





- Attestation


I have personally seen and examined this patient.: Yes


I have fully participated in the care of the patient.: Yes


I have reviewed all pertinent clinical information: Yes


Notes (Text): 





10/07/18 21:31


This is a 67 yr old F presenting with epigastric pain which is resolving now on 

steroids for COPD with intermittent dysphagia for solids and liquids likely due 

to motility defect or esophagitis. Also has chronic contipation on linzess with 

no BM in past 3 days. CT with moderate stool. Continue PPi. Discontinue NSAID's.

 Bowel regimen. Has outpatient gasteroenterologist with whom she had EGD/ 

colonoscopy last year. To follow with GI as outpatient. Advance diet as 

tolerated. On eliquis. Will need to hold it for 3 days prior to endoscopic 

evaluation. Will sign off now.

## 2018-10-07 NOTE — RAD
Date of service: 



10/06/2018



PROCEDURE:  Radiographs of the chest and abdomen (obstructive series)



HISTORY:

 abd pain 



COMPARISON:

No prior.



TECHNIQUE:

AP radiograph of the chest, with upright and supine radiographs of 

the abdomen.



FINDINGS:



CHEST:

Lungs: Clear.



Cardiovascular: Normal size heart. No pulmonary vascular congestion.



Pleura: No pleural fluid. No pneumothorax.



Other findings: None.



ABDOMEN AND PELVIS:

Bowel: Unremarkable bowel gas pattern. No evidence of mechanical 

obstruction..  There appears to be moderate amount of stool seen 

throughout the ascending and transverse colon suggesting mild fecal 

retention/constipation.



Free air: None.



Bones:  Unremarkable.



Other findings: None.



IMPRESSION:

Unremarkable radiographs of chest and abdomen. No evidence of 

mechanical bowel obstruction however the findings suggest mild fecal 

retention/constipation..

## 2018-10-08 VITALS — RESPIRATION RATE: 20 BRPM

## 2018-10-08 LAB
ALBUMIN SERPL-MCNC: 3.3 G/DL (ref 3.5–5)
ALBUMIN/GLOB SERPL: 1.2 {RATIO} (ref 1–2.1)
ALT SERPL-CCNC: 38 U/L (ref 9–52)
AST SERPL-CCNC: 27 U/L (ref 14–36)
BASOPHILS # BLD AUTO: 0.1 K/UL (ref 0–0.2)
BASOPHILS NFR BLD: 1.2 % (ref 0–2)
BUN SERPL-MCNC: 19 MG/DL (ref 7–17)
CALCIUM SERPL-MCNC: 9 MG/DL (ref 8.6–10.4)
EOSINOPHIL # BLD AUTO: 0.3 K/UL (ref 0–0.7)
EOSINOPHIL NFR BLD: 2.4 % (ref 0–4)
ERYTHROCYTE [DISTWIDTH] IN BLOOD BY AUTOMATED COUNT: 14.2 % (ref 11.5–14.5)
GFR NON-AFRICAN AMERICAN: > 60
HGB BLD-MCNC: 13.5 G/DL (ref 11–16)
LYMPHOCYTES # BLD AUTO: 3.3 K/UL (ref 1–4.3)
LYMPHOCYTES NFR BLD AUTO: 29.4 % (ref 20–40)
MCH RBC QN AUTO: 29.8 PG (ref 27–31)
MCHC RBC AUTO-ENTMCNC: 33.5 G/DL (ref 33–37)
MCV RBC AUTO: 89 FL (ref 81–99)
MONOCYTES # BLD: 1 K/UL (ref 0–0.8)
MONOCYTES NFR BLD: 8.8 % (ref 0–10)
NEUTROPHILS # BLD: 6.5 K/UL (ref 1.8–7)
NEUTROPHILS NFR BLD AUTO: 58.2 % (ref 50–75)
NRBC BLD AUTO-RTO: 0.1 % (ref 0–2)
PLATELET # BLD: 300 K/UL (ref 130–400)
PMV BLD AUTO: 9 FL (ref 7.2–11.7)
RBC # BLD AUTO: 4.52 MIL/UL (ref 3.8–5.2)
WBC # BLD AUTO: 11.2 K/UL (ref 4.8–10.8)

## 2018-10-08 RX ADMIN — POLYETHYLENE GLYCOL 3350 SCH: 17 POWDER, FOR SOLUTION ORAL at 17:45

## 2018-10-08 RX ADMIN — POLYETHYLENE GLYCOL 3350 SCH GM: 17 POWDER, FOR SOLUTION ORAL at 17:43

## 2018-10-08 RX ADMIN — POLYETHYLENE GLYCOL 3350 SCH GM: 17 POWDER, FOR SOLUTION ORAL at 09:58

## 2018-10-08 RX ADMIN — DEXTROSE AND SODIUM CHLORIDE SCH: 5; 450 INJECTION, SOLUTION INTRAVENOUS at 05:46

## 2018-10-08 RX ADMIN — ROSUVASTATIN CALCIUM SCH MG: 5 TABLET, FILM COATED ORAL at 21:52

## 2018-10-08 NOTE — HP
CHIEF COMPLAINT:  Sudden onset of epigastric burning pain associated with

shortness of breath and dizziness, which started last night around 9 p.m.



HISTORY OF PRESENT ILLNESS:  Ms. Santos Wetzel is a 67-year-old

female with a past medical history of hypertension, hypothyroidism,

hyperlipidemia, obstructive sleep apnea, on CPAP, seasonal allergies,

history of portal vein thrombosis diagnosed many years ago, was on

anticoagulation which was discontinued at [facility], but later she was

found to be developing portal vein thrombosis again, and the patient was

started back on Eliquis a few years ago at [facility].  She has

been following up with Dr. Jimenes from [place].  She came into the ED

for sudden onset of epigastric pain.  The history was obtained from the

patient and the patient's  who is at bedside.  As per them, she has

been having seasonal allergies to pollen and dust.  She was evaluated by

Primary.  She has been having persistent cough.  The patient was given

Singulair and prednisone 10 mg daily for one month, which the patient took

for three days.  Yesterday, they were at a party around 9 p.m., and they

were about to have dinner.  The patient suddenly started experiencing

epigastric pain, which was burning in nature as per her, 10/10, radiating

to the left upper quadrant.  Denies any nausea or vomiting, but complains

of mild shortness of breath and felt dizzy and had headache.  She could not

eat anything, so the patient was brought into the emergency room.  In the

ED, the patient was given treatment and admitted for further evaluation. 

This morning, the patient's pain is slightly better than yesterday.  It is

3/10, in the epigastric region, nonradiating.  Denies any headache or

dizziness.  Denies any chest pain or shortness of breath.  Complaining of

constipation for which she takes Linzess.  Her last bowel movement was

yesterday.  She denies any leg pains or leg cramps.  Denies any urinary

complaints.  Denies any other neurologic symptoms.



PAST MEDICAL HISTORY:  As described, portal vein thrombosis,

hypothyroidism, hyperlipidemia, obstructive sleep apnea, hypertension,

chronic constipation.  Uses CPAP for obstructive sleep apnea.



PAST SURGICAL HISTORY:  Underwent splenectomy about 25 years ago secondary

to infection as per the patient, cholecystectomy in , hysterectomy in

 and underwent resection about 35 years ago.



FAMILY HISTORY:  Coronary artery disease in mother who  at age 80. 

Father had history of stomach cancer,  at 75.



PERSONAL HISTORY:  She is .  Having five children.  Retired.  Living

with her .



SOCIAL HISTORY:  Denies smoking, alcohol, or drug abuse.



ALLERGIES:  SHE IS ALLERGIC TO MORPHINE.



MEDICATIONS:  Her home medications include simvastatin 10 mg daily,

Ventolin as needed, _____, Linzess 290 mcg daily, omeprazole 40 mg daily,

Eliquis 5 mg p.o. b.i.d., Synthroid 137 mcg p.o. daily, hydrochlorothiazide

25 mg p.o. daily, vitamin D 5000 units daily.



REVIEW OF SYSTEMS:  As described in history of present illness.  All other

systems reviewed and were found to be negative.



PHYSICAL EXAMINATION:

GENERAL:  Elderly female, lying in bed, in no acute distress.

VITAL SIGNS:  Blood pressure 116/66, pulse 53, respirations 20, temperature

98.6 degrees Fahrenheit, O2 sat is 99% on 2 L nasal cannula.

HEENT:  Pupils are equal, round and reacting to light and accommodation. 

Extraocular muscles intact.  No icterus.  No pallor.  No oral thrush.  No

pharyngeal congestion.

NECK:  Supple.  No JVD.

LUNGS:  Bilateral vesicular breath sounds.  No wheezing.  No rhonchi.

CARDIOVASCULAR SYSTEM:  S1 and S2 present, irregular.

ABDOMEN:  Soft.  Bowel sounds are present.  Minimal epigastric tenderness

noted.  No guarding.  No rigidity.  No rebound tenderness noted.

CENTRAL NERVOUS SYSTEM:  Alert, awake, oriented x3.  No focal deficits

noted.

EXTREMITIES:  No edema.  Palpable peripheral pulses.



LABORATORY DATA:  Labs done from the ED:  WBC 18.4, hemoglobin 14.5,

hematocrit 43.1 and platelets 364.  PT 11.5, INR 1.1.  Sodium 141,

potassium 3.4, chloride 105, bicarbonate 23, BUN 26, creatinine 0.7,

glucose 155, calcium 9.7.  Total bilirubin 0.3, AST 22, ALT 31, alkaline

phosphatase 105.  Cardiac enzymes x1 negative.  Total protein 7.3, albumin

4.3.  Cholesterol 169, triglycerides 63, LDL 77, HDL 69.  Lipase 132.  TSH

0.55.  UA:  Specific gravity 1.045, pH 6, leukocyte esterase 1+, otherwise

negative.  EKG consistent with normal sinus bradycardia at 56 beats per

minute.  No acute ST-T changes noted.  CT of the abdomen and pelvis

consistent with fatty hepatic infiltration, gallbladder not visualized,

spleen is not present.  Hysterectomy.  Abdominal obstructive series

unremarkable.  Radiographs of chest and abdomen, no evidence of mechanical

bowel obstruction, findings suggest mild fecal retention, constipation.



ASSESSMENT AND PLAN:  Elderly female with a history of left portal vein

thrombosis, on long-time anticoagulation; hypertension; hypothyroidism;

hyperlipidemia; obstructive sleep apnea; seasonal allergies; and chronic

constipation who has been following with Dr. Jimenes from [place], her

primary care physician, and going to [facility] for portal vein

thrombosis followup who was given Singulair and prednisone about three days

ago for her allergies.  Came into the emergency department with sudden

onset of epigastric burning pain, radiating to the left upper quadrant,

associated with mild shortness of breath and dizziness without any

vomiting.  In the emergency department, the patient was found to be having

elevated white blood cells count and persistent abdominal pain as the

patient has been admitted for further management.

1.  Epigastric abdominal pain most likely secondary to acute gastritis from

recent steroid use, rule out other causes.

2.  Elevated white blood cell count, probably secondary to steroid use.

3.  Hypokalemia.

4.  History of hypothyroidism.

5.  History of hypertension.

6.  History of hyperlipidemia.

7.  History of portal vein thrombosis, on long-term anticoagulation.

8.  History of obstructive sleep apnea and seasonal allergies, on

continuous positive airway pressure.



PLAN:  The patient is being admitted to the hospital.  We will give the

patient Protonix 40 mg IV daily along with Maalox as needed.  Advanced

diet.  Her repeat WBC count is 14.1 which is improving.  We will hold

prednisone.  Continue with Singulair.  Continue with Eliquis.  Her blood

pressure is stable on hydrochlorothiazide.  Continue with Zocor.  TSH is

within normal limits.  Continue with Synthroid 137 mcg p.o. daily.  We will

give KCl supplementation.  Repeat labs in a.m.  Give lactulose one dose. 

We will check serial troponins and echocardiogram.  Monitor her heart rates

which are running in low 50s.  We will obtain GI evaluation.  We will add

further recommendations as her clinical course progresses.





__________________________________________

Hira Uribe MD





DD:  10/07/2018 12:54:27

DT:  10/07/2018 16:38:26

[facility] # [id number]

## 2018-10-08 NOTE — CP.PCM.PN
Subjective





- Date & Time of Evaluation


Date of Evaluation: 10/08/18


Time of Evaluation: 10:07





- Subjective


Subjective: 





Progress note dictated #[id number]





Objective





- Vital Signs/Intake and Output


Vital Signs (last 24 hours): 


                                        











Temp Pulse Resp BP Pulse Ox


 


 98.3 F   60   20   116/71   99 


 


 10/08/18 08:51  10/08/18 08:51  10/08/18 08:51  10/08/18 08:51  10/08/18 08:51








Intake and Output: 


                                        











 10/08/18 10/08/18





 06:59 18:59


 


Intake Total 1000 600


 


Output Total 500 


 


Balance 500 600














- Medications


Medications: 


                               Current Medications





Al Hydrox/Mg Hydrox/Simethicone (Maalox Plus 30 Ml)  30 ml PO Q8H PRN


   PRN Reason: Indigestion / Heartburn


Albuterol (Ventolin Hfa 90 Mcg/Actuation (8 G))  1 puff IH RQ24 PRN


   PRN Reason: Shortness of Breath


Apixaban (Eliquis)  5 mg PO BID [facility]


   Last Admin: 10/07/18 10:07 Dose:  5 mg


Docusate Sodium (Colace)  100 mg PO BID [facility]


   Last Admin: 10/08/18 09:58 Dose:  100 mg


Famotidine (Pepcid)  20 mg IVP DAILY [facility]


   Last Admin: 10/08/18 10:01 Dose:  20 mg


Home Med (Linaclotide [Linzess])  290 mcg PO DAILY [facility]


Hydrochlorothiazide (Hydrodiuril)  25 mg PO DAILY [facility]


   Last Admin: 10/08/18 09:58 Dose:  25 mg


Dextrose/Sodium Chloride (Dextrose 5%/0.45% Ns 1000 Ml)  1,000 mls @ 75 mls/hr 

IV .[id number] [facility]


   Last Admin: 10/08/18 05:46 Dose:  Not Given


Levothyroxine Sodium (Levothroid)  137 mcg PO DAILY@0630 [facility]


   Last Admin: 10/08/18 05:44 Dose:  137 mcg


Ondansetron HCl (Zofran Inj)  4 mg IVP Q8 PRN


   PRN Reason: Nausea/Vomiting


Pantoprazole Sodium (Protonix Inj)  40 mg IVP DAILY [facility]


   Last Admin: 10/08/18 09:58 Dose:  40 mg


Polyethylene Glycol (Miralax)  17 gm PO BID [facility]


   Last Admin: 10/08/18 09:58 Dose:  17 gm


Rosuvastatin Calcium (Crestor)  2.5 mg PO HS [facility]


   Last Admin: 10/07/18 21:28 Dose:  2.5 mg











- Labs


Labs: 


                                        





                                 10/08/18 08:14 





                                 10/08/18 08:14 





                                        











PT  11.5 SECONDS (9.7-12.2)   10/06/18  23:19    


 


INR  1.1   10/06/18  23:19

## 2018-10-08 NOTE — CARD
--------------- APPROVED REPORT --------------





Date of service: 10/07/2018



EKG Measurement

Heart [id number]

MN [id number]

[id number]

[id number]

NKz199



<Conclusion>

Sinus bradycardia

Otherwise normal ECG

## 2018-10-08 NOTE — CARD
--------------- APPROVED REPORT --------------





Date of service: 10/08/2018



EXAM: Two-dimensional and M-mode echocardiogram with Doppler and 

color Doppler.



Other Information 

Quality : GoodRhythm : NSR



INDICATION

i v infecation



2D DIMENSIONS 

LVOT Diameter1.7   (1.8-2.4cm)LA Gnmclc31   (18-58mL)



M-Mode DIMENSIONS 

Left Atrium (MM)3.58   (2.5-4.0cm)IVSd0.81   (0.7-1.1cm)

Aortic Root2.64   (2.2-3.7cm)LVDd4.20   (4.0-5.6cm)

Aortic Cusp Exc.1.46   (1.5-2.0cm)PWd0.78   (0.7-1.1cm)

FS (%) 43   %LVDs2.38   (2.0-3.8cm)

LVEF (%)75   (>50%)



Aortic Valve

AoV Peak Vppwcmvo557.4cm/Celeste Peak GR.41mmHgLVOT Peak 

Pyixwtmf813.1cm/s

LVOT VTI28.23cmAVA (VMAX)0.75cm2



Mitral Valve

MV E Faohqgpl06.1cm/sMV A Gawsyjna572.4cm/sE/A ratio0.8



TDI

Lateral E' Peak V8.61cm/sMedial E' Peak V8.16cm/sE/Lateral E'10.0

E/Medial E'10.6



Tricuspid Valve

TR Peak Zxisxciy398bw/sTR Peak Gr.57pvFeWCKB11hoPn



 LEFT VENTRICLE 

The left ventricle is normal size.

There is borderline concentric left ventricular hypertrophy.

The left ventricular function is normal.

The left ventricular ejection fraction is within the normal range.

There is normal LV segmental wall motion.

Transmitral Doppler flow pattern is Grade I-abnormal relaxation 

pattern.



 RIGHT VENTRICLE 

The right ventricle is normal size.

There is normal right ventricular wall thickness.

The right ventricular systolic function is normal.



 ATRIA 

The left atrium size is normal.

The right atrium size is normal.



 AORTIC VALVE 

The aortic valve is not well visualized and is probably bicuspid.

There is trace aortic regurgitation.

There is moderate to severe valvular aortic stenosis.



 MITRAL VALVE 

The mitral valve is mildly thickened.

There is no evidence of mitral valve prolapse.

There is no mitral valve stenosis.

There is no mitral valve regurgitation noted.



 TRICUSPID VALVE 

The tricuspid valve is normal in structure.

There is no tricuspid valve regurgitation noted.



 PULMONIC VALVE 

The pulmonary valve is normal in structure.

There is trace pulmonic valvular regurgitation. 



 GREAT VESSELS 

The aortic root is normal in size.

The IVC was not visualized.



 PERICARDIAL EFFUSION 

There is a trace loculated anterior pericardial effusion.



<Conclusion>

The left ventricle is normal size.

There is borderline concentric left ventricular hypertrophy.

The left ventricular function is normal.

The left ventricular ejection fraction is within the normal range.

There is normal LV segmental wall motion.

Transmitral Doppler flow pattern is Grade I-abnormal relaxation 

pattern.

The aortic valve is not well visualized and is probably bicuspid.

There is moderate to severe valvular aortic stenosis.

## 2018-10-09 VITALS — TEMPERATURE: 98.2 F

## 2018-10-09 RX ADMIN — ROSUVASTATIN CALCIUM SCH MG: 5 TABLET, FILM COATED ORAL at 21:39

## 2018-10-09 RX ADMIN — POLYETHYLENE GLYCOL 3350 SCH: 17 POWDER, FOR SOLUTION ORAL at 08:59

## 2018-10-09 RX ADMIN — POLYETHYLENE GLYCOL 3350 SCH GM: 17 POWDER, FOR SOLUTION ORAL at 21:42

## 2018-10-09 NOTE — CP.PCM.CON
History of Present Illness





- History of Present Illness


History of Present Illness: 





Moderate to severe AS


? Bicuspid valve


Likely needs [facility]





Past Patient History





- Past Social History


Smoking Status: Never Smoked





- CARDIAC


Hx Hypertension: Yes





- ENDOCRINE/METABOLIC


Other/Comment: thyroid disease





- PSYCHIATRIC


Hx Substance Use: No





Meds


Allergies/Adverse Reactions: 


                                    Allergies











Allergy/AdvReac Type Severity Reaction Status Date / Time


 


morphine Allergy   Verified 10/06/18 22:34














- Medications


Medications: 


                               Current Medications





Al Hydrox/Mg Hydrox/Simethicone (Maalox Plus 30 Ml)  30 ml PO Q8H PRN


   PRN Reason: Indigestion / Heartburn


Albuterol (Ventolin Hfa 90 Mcg/Actuation (8 G))  1 puff IH RQ24 PRN


   PRN Reason: Shortness of Breath


Apixaban (Eliquis)  5 mg PO BID [facility]


   Last Admin: 10/08/18 17:43 Dose:  5 mg


Docusate Sodium (Colace)  100 mg PO BID [facility]


   Last Admin: 10/08/18 21:53 Dose:  Not Given


Famotidine (Pepcid)  20 mg IVP DAILY [facility]


   Last Admin: 10/08/18 10:01 Dose:  20 mg


Home Med (Linaclotide [Linzess])  290 mcg PO DAILY [facility]


Hydrochlorothiazide (Hydrodiuril)  25 mg PO DAILY [facility]


   Last Admin: 10/08/18 09:58 Dose:  25 mg


Levothyroxine Sodium (Levothroid)  137 mcg PO DAILY@0630 [facility]


   Last Admin: 10/09/18 05:30 Dose:  137 mcg


Ondansetron HCl (Zofran Inj)  4 mg IVP Q8 PRN


   PRN Reason: Nausea/Vomiting


Pantoprazole Sodium (Protonix Inj)  40 mg IVP DAILY [facility]


   Last Admin: 10/08/18 09:58 Dose:  40 mg


Polyethylene Glycol (Miralax)  17 gm PO BID [facility]


   Last Admin: 10/08/18 17:45 Dose:  Not Given


Rosuvastatin Calcium (Crestor)  2.5 mg PO HS [facility]


   Last Admin: 10/08/18 21:52 Dose:  2.5 mg











Results





- Vital Signs


Recent Vital Signs: 


                                Last Vital Signs











Temp  98.8 F   10/08/18 23:46


 


Pulse  63   10/08/18 23:46


 


Resp  20   10/08/18 23:46


 


BP  111/70   10/08/18 23:46


 


Pulse Ox  97   10/08/18 23:46














- Labs


Result Diagrams: 


                                 10/08/18 08:14





                                 10/08/18 08:14


Labs: 


                         Laboratory Results - last 24 hr











  10/07/18 10/08/18 10/08/18





  08:43 08:14 08:14


 


WBC   11.2 H 


 


RBC   4.52 


 


Hgb   13.5 


 


Hct   40.3 


 


MCV   89.0 


 


MCH   29.8 


 


MCHC   33.5 


 


RDW   14.2 


 


Plt Count   300 


 


MPV   9.0 


 


Neut % (Auto)   58.2 


 


Lymph % (Auto)   29.4 


 


Mono % (Auto)   8.8 


 


Eos % (Auto)   2.4 


 


Baso % (Auto)   1.2 


 


Neut # (Auto)   6.5 


 


Lymph # (Auto)   3.3 


 


Mono # (Auto)   1.0 H 


 


Eos # (Auto)   0.3 


 


Baso # (Auto)   0.1 


 


Sodium    140


 


Potassium    4.1


 


Chloride    105


 


Carbon Dioxide    26


 


Anion Gap    13


 


BUN    19 H


 


Creatinine    0.7


 


Est GFR ( Amer)    > 60


 


Est GFR (Non-Af Amer)    > 60


 


Random Glucose    100


 


Hemoglobin A1c  6.1  


 


Calcium    9.0


 


Phosphorus    3.7


 


Magnesium    1.9


 


Total Bilirubin    0.5


 


AST    27


 


ALT    38


 


Alkaline Phosphatase    71


 


Troponin I   


 


Total Protein    5.9 L


 


Albumin    3.3 L


 


Globulin    2.6


 


Albumin/Globulin Ratio    1.2














  10/08/18





  13:45


 


WBC 


 


RBC 


 


Hgb 


 


Hct 


 


MCV 


 


MCH 


 


MCHC 


 


RDW 


 


Plt Count 


 


MPV 


 


Neut % (Auto) 


 


Lymph % (Auto) 


 


Mono % (Auto) 


 


Eos % (Auto) 


 


Baso % (Auto) 


 


Neut # (Auto) 


 


Lymph # (Auto) 


 


Mono # (Auto) 


 


Eos # (Auto) 


 


Baso # (Auto) 


 


Sodium 


 


Potassium 


 


Chloride 


 


Carbon Dioxide 


 


Anion Gap 


 


BUN 


 


Creatinine 


 


Est GFR ( Amer) 


 


Est GFR (Non-Af Amer) 


 


Random Glucose 


 


Hemoglobin A1c 


 


Calcium 


 


Phosphorus 


 


Magnesium 


 


Total Bilirubin 


 


AST 


 


ALT 


 


Alkaline Phosphatase 


 


Troponin I  < 0.0120


 


Total Protein 


 


Albumin 


 


Globulin 


 


Albumin/Globulin Ratio

## 2018-10-09 NOTE — CP.PCM.PN
Subjective





- Date & Time of Evaluation


Date of Evaluation: 10/09/18


Time of Evaluation: 10:53





- Subjective


Subjective: 





Progress note dictated # [id number]





Objective





- Vital Signs/Intake and Output


Vital Signs (last 24 hours): 


                                        











Temp Pulse Resp BP Pulse Ox


 


 98.3 F   61   20   108/64   97 


 


 10/09/18 08:00  10/09/18 08:00  10/09/18 08:00  10/09/18 08:00  10/09/18 08:00











- Medications


Medications: 


                               Current Medications





Al Hydrox/Mg Hydrox/Simethicone (Maalox Plus 30 Ml)  30 ml PO Q8H PRN


   PRN Reason: Indigestion / Heartburn


Albuterol (Ventolin Hfa 90 Mcg/Actuation (8 G))  1 puff IH RQ24 PRN


   PRN Reason: Shortness of Breath


Apixaban (Eliquis)  5 mg PO BID [facility]


   Last Admin: 10/09/18 08:59 Dose:  5 mg


Docusate Sodium (Colace)  100 mg PO BID [facility]


   Last Admin: 10/09/18 08:59 Dose:  100 mg


Famotidine (Pepcid)  20 mg IVP DAILY [facility]


   Last Admin: 10/09/18 08:59 Dose:  20 mg


Home Med (Linaclotide [Linzess])  290 mcg PO DAILY [facility]


Hydrochlorothiazide (Hydrodiuril)  25 mg PO DAILY [facility]


   Last Admin: 10/09/18 08:59 Dose:  25 mg


Levothyroxine Sodium (Levothroid)  137 mcg PO DAILY@0630 [facility]


   Last Admin: 10/09/18 05:30 Dose:  137 mcg


Ondansetron HCl (Zofran Inj)  4 mg IVP Q8 PRN


   PRN Reason: Nausea/Vomiting


Pantoprazole Sodium (Protonix Inj)  40 mg IVP DAILY [facility]


   Last Admin: 10/09/18 08:59 Dose:  40 mg


Polyethylene Glycol (Miralax)  17 gm PO BID [facility]


   Last Admin: 10/09/18 08:59 Dose:  Not Given


Rosuvastatin Calcium (Crestor)  2.5 mg PO [facility]


   Last Admin: 10/08/18 21:52 Dose:  2.5 mg











- Labs


Labs: 


                                        





                                 10/08/18 08:14 





                                 10/08/18 08:14 





                                        











PT  11.5 SECONDS (9.7-12.2)   10/06/18  23:19    


 


INR  1.1   10/06/18  23:19

## 2018-10-09 NOTE — CP.PCM.PN
Subjective





- Date & Time of Evaluation


Date of Evaluation: 10/09/18


Time of Evaluation: 16:20





- Subjective


Subjective: 





Patient seen and evaluated


No cardiac symptoms





Moderate to severe AS


Out patient f/u with me in 1-2 months


[facility] as out patient





Objective





- Vital Signs/Intake and Output


Vital Signs (last 24 hours): 


                                        











Temp Pulse Resp BP Pulse Ox


 


 98.2 F   76   20   123/72   96 


 


 10/09/18 16:10  10/09/18 16:10  10/09/18 16:10  10/09/18 16:10  10/09/18 16:10








Intake and Output: 


                                        











 10/09/18 10/10/18





 18:59 06:59


 


Intake Total 365 


 


Balance 365 














- Medications


Medications: 


                               Current Medications





Al Hydrox/Mg Hydrox/Simethicone (Maalox Plus 30 Ml)  30 ml PO Q8H PRN


   PRN Reason: Indigestion / Heartburn


Albuterol (Ventolin Hfa 90 Mcg/Actuation (8 G))  1 puff IH RQ24 PRN


   PRN Reason: Shortness of Breath


Apixaban (Eliquis)  5 mg PO BID [facility]


   Last Admin: 10/09/18 17:33 Dose:  5 mg


Docusate Sodium (Colace)  100 mg PO BID [facility]


   Last Admin: 10/09/18 17:33 Dose:  100 mg


Home Med (Linaclotide [Linzess])  290 mcg PO DAILY [facility]


Hydrochlorothiazide (Hydrodiuril)  25 mg PO DAILY [facility]


   Last Admin: 10/09/18 08:59 Dose:  25 mg


Levothyroxine Sodium (Levothroid)  137 mcg PO DAILY@0630 [facility]


   Last Admin: 10/09/18 05:30 Dose:  137 mcg


Ondansetron HCl (Zofran Inj)  4 mg IVP Q8 PRN


   PRN Reason: Nausea/Vomiting


Pantoprazole Sodium (Protonix Inj)  40 mg IVP DAILY [facility]


   Last Admin: 10/09/18 08:59 Dose:  40 mg


Polyethylene Glycol (Miralax)  17 gm PO BID [facility]


   Last Admin: 10/09/18 21:42 Dose:  17 gm


Rosuvastatin Calcium (Crestor)  2.5 mg PO HS [facility]


   Last Admin: 10/09/18 21:39 Dose:  2.5 mg











- Labs


Labs: 


                                        





                                 10/08/18 08:14 





                                 10/08/18 08:14 





                                        











PT  11.5 SECONDS (9.7-12.2)   10/06/18  23:19    


 


INR  1.1   10/06/18  23:19

## 2018-10-09 NOTE — PN
DATE:  10/08/2018



SUBJECTIVE:  The patient was seen and examined at bedside.  She is feeling

much better.  Epigastric pain is less than yesterday.  Denies any chest

pain.  Denies any shortness of breath.  Denies any other new complaints. 

She claims that she was told by GI doctors that she was going to go for

endoscopy this morning and the patient was kept n.p.o. and awaiting for GI

to been seen.  Denies any other complaints.



PHYSICAL EXAMINATION:

GENERAL:  Elderly female lying in bed, in no acute distress.

VITAL SIGNS:  Blood pressure 116/71, pulse 60, respirations 20, temperature

98.3 degree Fahrenheit, O2 sat is 99% on room air.

HEENT:  Pupils equal, round, reacting to light and accommodation. 

Extraocular muscles intact.  No icterus.  No pallor.  No oral thrush.  No

pharyngeal congestion.

NECK:  Supple.  No JVD.

LUNGS:  Bilateral vesicular breath sounds.  No wheezing.  No rhonchi.

CVS:  S1, S2 present.  Regular.

ABDOMEN:  Soft.  Bowel sounds present.  Epigastric tenderness noted.  No

guarding.  No rigidity.  No rebound tenderness noted.

CNS:  Alert, awake, oriented x3.  No focal deficits noted.

EXTREMITIES:  No edema.  Palpable peripheral pulses.



MEDICATIONS:  Maalox as needed, Ventolin, Eliquis 5 mg p.o. b.i.d., Colace

100 mg p.o. b.i.d., Pepcid 20 mg IV daily, Linzess, hydrochlorothiazide 25

mg p.o. daily, Synthroid 135 mcg p.o. daily, Zofran as needed, Protonix 40

mg IV push daily, MiraLax 17 gm p.o. b.i.d., Crestor 2.5 mg p.o. at

bedtime.



LABORATORY DATA:  Labs done from this morning; WBC 11.2, hemoglobin 13.5,

hematocrit 40.3, platelets 300.  Sodium 140, potassium 4.1, chloride 105,

bicarb 26, BUN 19, creatinine 0.7, glucose 100, calcium 9, phosphorus 3.7,

magnesium 1.9, total bilirubin 0.5, AST 27, ALT 38, alkaline phosphatase

71, cardiac enzymes x3 negative, total protein 5.9, albumin 3.3.  Blood

culture x2 negative so far.  Urine culture negative.  Echocardiogram done

this afternoon consistent with a trace loculated anterior pericardial

effusion, normal ejection fraction, moderate-to-severe valvular aortic

stenosis.



ASSESSMENT AND PLAN:  Elderly female with history of portal vein thrombosis

on long term anticoagulation, hypertension, hyperlipidemia, hypothyroidism,

obstructive sleep apnea, who was started on prednisone recently about three

days ago prior to admission, came into the hospital with epigastric burning

pain, most likely consistent with acute gastritis, acute coronary syndrome

ruled out.  Three sets of cardiac enzymes were negative.  No EKG changes. 

Echo consistent with moderate-to-severe aortic stenosis and trace loculated

pleural effusion.  GI consult appreciated.  The patient was underwent the

impression that she was going to undergo endoscopy this morning as she was

told by the GI team.  Later, GI team decided not to proceed with EGD as the

patient in on Eliquis, which was not conveyed to the patient and the

patient was upset that she was not informed about the changes made to the

plan.  Discussed with the patient at length.  We will advance diet as

tolerated.  We will continue with Protonix.  We will obtain Cardiology

evaluation.  GI recommending EGD as outpatient.  The patient also underwent

EGD and colonoscopy as outpatient.  I advised the patient to follow up with

her own gastroenterologist for further management of her symptoms.  We will

continue with other current therapy.







__________________________________________

Hira Uribe MD





DD:  10/08/2018 19:22:31

DT:  10/08/2018 23:13:35

Job # [id number]

## 2018-10-10 VITALS — HEART RATE: 56 BPM | SYSTOLIC BLOOD PRESSURE: 123 MMHG | DIASTOLIC BLOOD PRESSURE: 77 MMHG | OXYGEN SATURATION: 99 %

## 2018-10-10 RX ADMIN — POLYETHYLENE GLYCOL 3350 SCH: 17 POWDER, FOR SOLUTION ORAL at 09:53

## 2018-10-10 NOTE — CP.PCM.PN
Subjective





- Date & Time of Evaluation


Date of Evaluation: 10/10/18


Time of Evaluation: 10:25





- Subjective


Subjective: 





Discharge summary dictated #[id number]





Objective





- Vital Signs/Intake and Output


Vital Signs (last 24 hours): 


                                        











Temp Pulse Resp BP Pulse Ox


 


 98.2 F   56 L  20   123/77   99 


 


 10/10/18 00:06  10/10/18 09:48  10/10/18 09:48  10/10/18 09:48  10/10/18 09:48








Intake and Output: 


                                        











 10/10/18 10/10/18





 06:59 18:59


 


Intake Total 640 


 


Balance 640 














- Medications


Medications: 


                               Current Medications





Al Hydrox/Mg Hydrox/Simethicone (Maalox Plus 30 Ml)  30 ml PO Q8H PRN


   PRN Reason: Indigestion / Heartburn


Albuterol (Ventolin Hfa 90 Mcg/Actuation (8 G))  1 puff IH RQ24 PRN


   PRN Reason: Shortness of Breath


Apixaban (Eliquis)  5 mg PO BID [facility]


   Last Admin: 10/10/18 09:42 Dose:  5 mg


Docusate Sodium (Colace)  100 mg PO BID [facility]


   Last Admin: 10/10/18 09:42 Dose:  100 mg


Home Med (Linaclotide [Linzess])  290 mcg PO DAILY [facility]


Hydrochlorothiazide (Hydrodiuril)  25 mg PO DAILY [facility]


   Last Admin: 10/10/18 09:42 Dose:  25 mg


Levothyroxine Sodium (Levothroid)  137 mcg PO DAILY@0630 [facility]


   Last Admin: 10/10/18 06:02 Dose:  137 mcg


Ondansetron HCl (Zofran Inj)  4 mg IVP Q8 PRN


   PRN Reason: Nausea/Vomiting


Pantoprazole Sodium (Protonix Inj)  40 mg IVP DAILY [facility]


   Last Admin: 10/10/18 09:42 Dose:  40 mg


Polyethylene Glycol (Miralax)  17 gm PO BID [facility]


   Last Admin: 10/10/18 09:53 Dose:  Not Given


Rosuvastatin Calcium (Crestor)  2.5 mg PO HS [facility]


   Last Admin: 10/09/18 21:39 Dose:  2.5 mg











- Labs


Labs: 


                                        





                                 10/08/18 08:14 





                                 10/08/18 08:14 





                                        











PT  11.5 SECONDS (9.7-12.2)   10/06/18  23:19    


 


INR  1.1   10/06/18  23:19

## 2018-10-10 NOTE — PN
DATE:  10/09/2018



SUBJECTIVE:  The patient was seen and examined at bedside.  The patient is

complaining of epigastric abdominal pain, better than yesterday but still

present.  Denies any nausea or vomiting.  Denies any headache.  Denies any

chest pain.  All other systems reviewed and were found to be negative.



PHYSICAL EXAMINATION:

GENERAL:  Elderly female, lying in bed, in no acute distress.

VITAL SIGNS:  Blood pressure 123/72, pulse 76, respirations 20, temperature

98.2 degrees Fahrenheit, and O2 sat is 96% on room air.

HEENT:  Pupils equal, round, and reacting to light and accommodation. 

Extraocular muscles intact.  No icterus.  No pallor.  No oral thrush.  No

pharyngeal congestion.

NECK:  Supple.  No JVD.

LUNGS:  Bilateral vesicular breath sounds.  No wheezing.  No rhonchi.

CARDIOVASCULAR SYSTEM:  S1 and S2 present, regular.

ABDOMEN:  Soft.  Epigastric tenderness present.  No guarding.  No rigidity.

No rebound tenderness noted.

CENTRAL NERVOUS SYSTEM:  Alert, awake, and oriented x3.  No focal deficits

noted.

EXTREMITIES:  No edema.  Palpable peripheral pulses.



MEDICATIONS:  Include Maalox as needed, Ventolin, Eliquis 5 mg p.o. b.i.d.,

Colace 100 mg p.o. b.i.d., Pepcid 20 mg IV push daily, hydrochlorothiazide

25 mg daily, Synthroid 137 mcg daily, Zofran 4 mg IV push every 8 hours

p.r.n., Protonix 40 mg IV daily, MiraLAX 17 g p.o. b.i.d., Crestor 2.5 mg

p.o. at bedtime.



LABORATORY DATA:  Blood cultures not available for today.  Urine culture

negative.  Blood cultures negative so far.



ASSESSMENT AND PLAN:  Elderly female with history of hypertension,

hypothyroidism, hyperlipidemia, obstructive sleep apnea, portal vein

thrombosis, on long-term anticoagulation.  Admitted for abdominal pain,

most likely secondary to acute gastritis and abnormal echocardiogram

consistent with moderate-to-severe aortic stenosis and trace pericardial

effusion.  Follow up with Cardiology regarding  treatment plan.  Continue

with Protonix and Maalox as needed.  Continue with Eliquis.  Continue with

other medications.  If cleared by Cardiology, we will plan discharging the

patient home.





__________________________________________

Hira Uribe MD





DD:  10/09/2018 20:18:51

DT:  10/09/2018 23:32:10

[facility] # [id number]

## 2018-10-11 NOTE — DS
DISCHARGE DIAGNOSES:  Acute gastritis, elevated WBC count,

moderate-to-severe aortic stenosis, history of hypertension, history of

hypothyroidism, history of hyperlipidemia, history of obstructive sleep

apnea, history of portal vein thrombosis, on long term anticoagulation.



HISTORY OF PRESENT ILLNESS:  Ms. Santos Wetzel is a 67-year-old

female with past medical history of hypertension, hypothyroidism,

hyperlipidemia, obstructive sleep apnea, on CPAP.  SHE HAS NO ALLERGIES,

history of portal vein thrombosis, on long-term anticoagulation, admitted

to the hospital with sudden onset of epigastric pain radiating to the left

upper quadrant associated with mild shortness of breath.  In the ED, the

patient had CT of the abdomen and pelvis which was negative, and the

patient had persistent abdominal pain, elevated WBC count, so the patient

was admitted to the hospital.  This morning, the patient is feeling much

better.  Denies any headache, dizziness.  Denies any chest pain, shortness

of breath, or wheezing.  Denies any nausea, vomiting, but the abdominal

pain is much better.  Denies any diarrhea, constipation.  Denies any

urinary complaints.  Denies any leg pains or leg cramps.  All other systems

reviewed and were found to be negative.



PHYSICAL EXAMINATION:

GENERAL:  Elderly female, lying in bed, in no acute distress.

VITAL SIGNS:  Blood pressure 123/77, pulse 56, respirations 20, temperature

98.3 degrees Fahrenheit, O2 saturation is 99% on room air.

HEENT:  Pupils equal, round, and reacting to light and accommodation. 

Extraocular muscles intact.  No icterus.  No pallor.  No oral thrush.  No

pharyngeal congestion.

NECK:  Supple.  No JVD.

LUNGS:  Bilateral vesicular breath sounds.  No wheezing.  No rhonchi.

CARDIOVASCULAR SYSTEM:  S1 and S2 present and regular.

ABDOMEN:  Soft.  Bowel sounds present.  Mild epigastric tenderness noted. 

No guarding.  No rigidity.  No rebound tenderness noted.

CENTRAL NERVOUS SYSTEM:  Alert, awake, oriented x3.  No focal deficits

noted.

EXTREMITIES:  No edema.  Palpable peripheral pulses.



LABORATORY DATA:   Labs from 10/08/2018, WBC 11.2, hemoglobin 13.5,

hematocrit 40.3, platelets 300,000.  Sodium 140, potassium 4.1, chloride

105, bicarb 26, BUN 19, creatinine 0.7, glucose 100.  Calcium 9, phosphorus

3.7, magnesium 1.9, bilirubin 0.5, AST 27, ALT 38, alkaline phosphatase 71.

Cardiac enzymes x3 negative.  Cholesterol 169, LDL 77, HDL 69, TSH 0.55.



HOSPITAL COURSE:  The patient was admitted to the hospital.  The patient

was kept n.p.o., started on IV fluids.  The patient was given Protonix and

Maalox for epigastric pain.  She was evaluated by GI as the patient is on

long-term anticoagulation, on Eliquis.  GI wanted to do EGD as outpatient. 

The patient claimed that she already have a gastroenterologist from

outside.  She underwent EGD and colonoscopy in the past, and she also has

scheduled appointment with the gastroenterologist on 10/23/2018.  The

patient was advised to follow up with GI as outpatient.  The patient

underwent cardiac workup.  Her troponins were negative.  No EKG changes,

but her echocardiogram showed moderate-to-severe aortic stenosis with trace

loculated pericardial effusion for which the patient was evaluated by

Cardiology.  Cardiology recommended further workup for aortic stenosis as

outpatient.  The patient's symptoms improved.  The patient's diet was

advanced and tolerating regular heart healthy diet.  As the patient is

otherwise stable and cleared by GI and Cardiology, the patient was

discharged home.  Advised the patient to come to the emergency room or call

me if any worsening or recurrent or new symptoms occur.



CONDITION UPON DISCHARGE:  The patient is alert, awake, oriented x3, and

hemodynamically stable.



DISCHARGE INSTRUCTIONS:  Follow up with PMD.  Follow up with Cardiology in

one to two months.  Follow up with GI as scheduled on 10/23/2018.



DISCHARGE DIET:  Heart healthy, low sodium, low cholesterol, bland diet.



ACTIVITY:  As tolerated.



DISCHARGE MEDICATIONS:  Maalox 30 mL p.o. every 8 hours as needed, Zocor 10

mg daily, omeprazole 40 mg daily, Linzess 290 mcg capsule daily, Synthroid

137 mcg daily, Microzide 25 mg p.o. daily, vitamin D 5000 units daily,

Eliquis 5 mg p.o. 2 times daily, Ventolin one puff as needed.





__________________________________________

Hira Uribe MD



DD:  10/10/2018 22:33:52

DT:  10/11/2018 3:52:22

Job # [id number]